# Patient Record
Sex: FEMALE | NOT HISPANIC OR LATINO | URBAN - METROPOLITAN AREA
[De-identification: names, ages, dates, MRNs, and addresses within clinical notes are randomized per-mention and may not be internally consistent; named-entity substitution may affect disease eponyms.]

---
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
## 2023-07-24 NOTE — ED PEDIATRIC TRIAGE NOTE - WEIGHT KG
What Type Of Note Output Would You Prefer (Optional)?: Standard Output
How Severe Is Your Rash?: mild
Is This A New Presentation, Or A Follow-Up?: Rash
53.6

## 2023-10-05 NOTE — ED PEDIATRIC NURSE NOTE - PUPILS PERRL
I called patient with x-ray report.  No acute findings on x-ray.  Small amount of fluid.  Follow-up PCP worsening symptoms. yes

## 2025-01-15 ENCOUNTER — APPOINTMENT (OUTPATIENT)
Dept: FAMILY MEDICINE | Facility: CLINIC | Age: 20
End: 2025-01-15
